# Patient Record
Sex: MALE | Race: WHITE
[De-identification: names, ages, dates, MRNs, and addresses within clinical notes are randomized per-mention and may not be internally consistent; named-entity substitution may affect disease eponyms.]

---

## 2018-03-01 ENCOUNTER — HOSPITAL ENCOUNTER (EMERGENCY)
Dept: HOSPITAL 80 - FED | Age: 18
Discharge: HOME | End: 2018-03-01
Payer: SELF-PAY

## 2018-03-01 VITALS — SYSTOLIC BLOOD PRESSURE: 120 MMHG | DIASTOLIC BLOOD PRESSURE: 61 MMHG | HEART RATE: 73 BPM

## 2018-03-01 VITALS — RESPIRATION RATE: 18 BRPM | OXYGEN SATURATION: 97 % | TEMPERATURE: 98.2 F

## 2018-03-01 DIAGNOSIS — N23: Primary | ICD-10-CM

## 2018-03-01 NOTE — EDPHY
H & P


Time Seen by Provider: 03/01/18 12:42


HPI/ROS: 





CHIEF COMPLAINT:  Right flank pain





HISTORY OF PRESENT ILLNESS:  17-year-old male presents with right flank pain.  

Onset of severe right-sided flank pain this morning.  The pain waxes and wanes 

and is associated with multiple episodes of vomiting.  He was seen at 

[facility] just prior to arrival.  He was given IV normal saline 2 L, 

Zofran 4 mg IV and Toradol 30 mg IV.  Urinalysis positive for red blood cells, 

laboratory studies unremarkable.  KUB revealed a possible right-sided ureteral 

calculus.  He was sent here for CT scan and pain control.  The flank pain is 

currently 6/10.  Denies nausea and urinary symptoms.  No prior history of 

kidney stones.





REVIEW OF SYSTEMS:


Constitutional:  No fever, no chills


Eyes:  No visual changes


ENT:  No sore throat


Respiratory:  No cough, no shortness of breath


Cardiac:  No chest pain


Genitourinary:  No hematuria, no dysuria


Musculoskeletal:  No leg pain or swelling


Skin:  No rash


Neurological:  No headache, no weakness


Psychiatric:  No depression





Past Medical/Surgical History: 





Denies





Social History: 





Student at [employer]





Smoking Status: Never smoked


Physical Exam: 





General Appearance:  Alert, pleasant


Eyes:  Pupils equal and round, no conjunctival pallor


ENT, Mouth:  Mucous membranes moist


Neck:  Normal inspection


Respiratory:  Lungs are clear to auscultation


Cardiovascular:  Regular rate and rhythm


Gastrointestinal:  Abdomen is soft, right lower quadrant tenderness


Back:  No CVA tenderness


Neurological:  A&O, nonfocal, normal gait


Skin:  Warm and dry


Extremities:  Normal inspection


Psychiatric:  Mood and affect normal





Constitutional: 


 Initial Vital Signs











Temperature (C)  36.8 C   03/01/18 12:24


 


Heart Rate  68   03/01/18 12:24


 


Respiratory Rate  18   03/01/18 12:24


 


Blood Pressure  117/63   03/01/18 12:24


 


O2 Sat (%)  97   03/01/18 12:24








 











O2 Delivery Mode               Room Air














Allergies/Adverse Reactions: 


 





No Known Allergies Allergy (Unverified 03/01/18 12:24)


 








Home Medications: 














 Medication  Instructions  Recorded


 


Hydrocodone/APAP 5/325 [Norco 1 - 2 tab PO Q4H PRN #10 tab 03/01/18





5/325]  


 


Ondansetron  03/01/18














Medical Decision Making





- Diagnostics


Imaging Results: 


Abdomen/Pelvis CT  03/01/18 12:41


Impression:


1. 3-mm calculus at the right ureterovesical junction, with no significant 

hydroureter or hydronephrosis. Bilateral nonobstructive nephrolithiasis.


2. Chronic bilateral spondylolysis at L5.


3. Moderate constipation.


 


Results discussed with Dr. Kalyn Lowery on 1 March 2018 at 1325 hours.


Imaging: Discussed imaging studies w/ On call Radiologist, I viewed and 

interpreted images myself


ED Course/Re-evaluation: 





This patient presents with your right-sided flank pain and hematuria, most 

likely secondary to ureteral calculus.  CT scan of the abdomen pelvis ordered.  

Tylenol 1 g orally and lidocaine 100 mg IV ordered.





CT scan reveals a right UVJ stone, 3 mm; discussed with patient.  Feels much 

better, pain nearly gone. Wants to go home.  Kidney stone instructions given.





Differential Diagnosis: 





Differential diagnosis includes though it is not limited to appendicitis, 

cholecystitis, diverticulitis, pyelonephritis, bowel perforation, small bowel 

obstruction.





- Data Points


Medications Given: 


 








Discontinued Medications





Acetaminophen (Tylenol)  1,000 mg PO EDNOW ONE


   Stop: 03/01/18 13:22


   Last Admin: 03/01/18 14:02 Dose:  1,000 mg


Lidocaine HCl 100 mg/ Sodium (Chloride)  110 mls @ 600 mls/hr IV EDNOW ONE


   Stop: 03/01/18 13:55


   Last Admin: 03/01/18 14:03 Dose:  110 mls








Departure





- Departure


Disposition: Home, Routine, Self-Care


Clinical Impression: 


 Renal colic on right side





Condition: Good


Instructions:  Kidney Stones (ED), Renal Colic (ED), How to Strain Your Urine (

ED)


Additional Instructions: 


Ibuprofen 600 mg 3 times daily while the pain persists.





Referrals: 


Frederick Lazaro MD [Medical Doctor] - As per Instructions


(Call for an appointment.


)


Stand Alone Forms:  School Excuse


Prescriptions: 


Hydrocodone/APAP 5/325 [Norco 5/325] 1 - 2 tab PO Q4H PRN #10 tab


 PRN Reason: Pain, Moderate